# Patient Record
Sex: FEMALE
[De-identification: names, ages, dates, MRNs, and addresses within clinical notes are randomized per-mention and may not be internally consistent; named-entity substitution may affect disease eponyms.]

---

## 2018-03-12 ENCOUNTER — HOSPITAL ENCOUNTER (OUTPATIENT)
Dept: HOSPITAL 5 - SPVWC | Age: 73
Discharge: HOME | End: 2018-03-12
Attending: INTERNAL MEDICINE
Payer: MEDICARE

## 2018-03-12 DIAGNOSIS — Z12.31: Primary | ICD-10-CM

## 2018-03-12 PROCEDURE — 77067 SCR MAMMO BI INCL CAD: CPT

## 2018-03-13 NOTE — MAMMOGRAPHY REPORT
BILATERAL DIGITAL SCREENING MAMMOGRAM with CAD: 03/12/18 13:57:00



CLINICAL: Routine screening.



COMPARISON:03/07/17



FINDINGS: The breasts are heterogeneously dense, which may obscure 

small masses. No mass, architectural distortion or suspicious 

calcifications.



IMPRESSION: No mammographic evidence of malignancy.



BI-RADS CATEGORY: 1 - - Negative



RECOMMENDATION: Routine mammographic screening in one year.





COMMENT:

Patient follow-up letters are generated by our [employer] application.

## 2019-07-17 ENCOUNTER — HOSPITAL ENCOUNTER (OUTPATIENT)
Dept: HOSPITAL 5 - SPVWC | Age: 74
Discharge: HOME | End: 2019-07-17
Attending: INTERNAL MEDICINE
Payer: MEDICARE

## 2019-07-17 DIAGNOSIS — Z12.31: Primary | ICD-10-CM

## 2019-07-17 PROCEDURE — 77067 SCR MAMMO BI INCL CAD: CPT

## 2019-07-17 NOTE — MAMMOGRAPHY REPORT
BILATERAL DIGITAL SCREENING MAMMOGRAM WITH CAD



INDICATION:  Screening.  



COMPARISONS:  3/12/2018 and 3/7/2017



FINDINGS: Craniocaudal and mediolateral oblique views of both breasts were obtained using 2-D digital
 acquisition. In addition to standard review, the examination was analyzed for possible abnormalities
 using a computer-assisted detection device (iCAD).



The breast tissue is heterogeneously dense, which may obscure small masses.



Bilateral parenchymal asymmetries require additional imaging. No architectural distortion or suspicio
us calcifications.







IMPRESSION:  

Bilateral asymmetries requiring additional workup. Recommend recall for bilateral spot compression vi
ews and bilateral breast ultrasound if needed.



BI-RADS CATEGORY 0:  INCOMPLETE - NEED ADDITIONAL IMAGING EVALUATION AND/OR PRIOR MAMMOGRAMS FOR COMP
ARISON







Information is entered into a reminder system for a target due date for the next mammogram. The resul
ts and recommendations were sent to the patient by mail.



Signer Name: Andres Holm MD 

Signed: 7/17/2019 4:01 PM

 Workstation Name: [id number]

## 2019-09-10 ENCOUNTER — HOSPITAL ENCOUNTER (OUTPATIENT)
Dept: HOSPITAL 5 - SPVWC | Age: 74
Discharge: HOME | End: 2019-09-10
Attending: INTERNAL MEDICINE
Payer: MEDICARE

## 2019-09-10 DIAGNOSIS — R92.8: Primary | ICD-10-CM

## 2019-09-10 PROCEDURE — 77066 DX MAMMO INCL CAD BI: CPT

## 2019-09-11 NOTE — MAMMOGRAPHY REPORT
DIGITAL DIAGNOSTIC MAMMOGRAM WITHOUT CAD,  --  9/10/2019



INDICATION: Recall for bilateral asymmetries. ABNORMAL MAMMOGRAM



TECHNIQUE:  Digital bilateral mammographic imaging was performed. Spot compression views were obtaine
d.





COMPARISON: 7/17/2019



FINDINGS: 



Breast Density: The breasts are heterogeneously dense, which may obscure small masses.



There is no evidence of dominant mass, suspicious calcifications or architectural distortion in eithe
r breast. Bilateral additional views are negative.



IMPRESSION: No mammographic evidence of malignancy.



Follow up recommendation: Routine yearly



BI-RADS Category 1:  Negative.



A "normal" or negative report should not discourage follow up or biopsy of a clinically significant f
inding.



A written summary of these findings will be mailed to the patient. The patient will be entered into a
 mammography reporting system which will generate a reminder letter for the patient's next appointmen
t at the appropriate interval.



According to the [employer], yearly mammograms are recommended starting at age 40 
and continuing as long as a woman is in good health.  Breast MRI is recommended for women with an mary
roximately 20-25% or greater lifetime risk of breast cancer, including women with a strong family his
tory of breast or ovarian cancer and women who have been treated for Hodgkin's disease.



Signer Name: Andres Holm MD 

Signed: 9/11/2019 10:12 AM

 Workstation Name: [id number]